# Patient Record
Sex: MALE | Race: WHITE | NOT HISPANIC OR LATINO | Employment: FULL TIME | URBAN - METROPOLITAN AREA
[De-identification: names, ages, dates, MRNs, and addresses within clinical notes are randomized per-mention and may not be internally consistent; named-entity substitution may affect disease eponyms.]

---

## 2022-04-11 ENCOUNTER — HISTORICAL (OUTPATIENT)
Dept: ADMINISTRATIVE | Facility: HOSPITAL | Age: 67
End: 2022-04-11

## 2022-04-24 VITALS
DIASTOLIC BLOOD PRESSURE: 79 MMHG | SYSTOLIC BLOOD PRESSURE: 123 MMHG | HEIGHT: 68 IN | WEIGHT: 163.81 LBS | BODY MASS INDEX: 24.83 KG/M2

## 2022-11-04 PROBLEM — I25.10 ARTERIOSCLEROSIS OF CORONARY ARTERY: Status: ACTIVE | Noted: 2022-11-04

## 2022-11-04 PROBLEM — E11.9 DIABETES MELLITUS: Status: ACTIVE | Noted: 2022-11-04

## 2022-11-04 PROBLEM — C44.90 MALIGNANT NEOPLASM OF SKIN: Status: ACTIVE | Noted: 2022-11-04

## 2022-11-04 PROBLEM — E78.5 HYPERLIPIDEMIA: Status: ACTIVE | Noted: 2022-11-04

## 2022-11-04 PROBLEM — I21.9 MYOCARDIAL INFARCTION: Status: ACTIVE | Noted: 2022-11-04

## 2022-11-04 PROBLEM — G25.81 RESTLESS LEGS SYNDROME: Status: ACTIVE | Noted: 2022-11-04

## 2022-11-04 PROBLEM — I10 HYPERTENSION: Status: ACTIVE | Noted: 2022-11-04

## 2022-11-07 PROCEDURE — 86803 HEPATITIS C AB TEST: CPT | Performed by: FAMILY MEDICINE

## 2023-02-06 PROBLEM — I21.9 MYOCARDIAL INFARCTION: Status: RESOLVED | Noted: 2022-11-04 | Resolved: 2023-02-06

## 2023-04-13 PROBLEM — E29.1 HYPOGONADISM IN MALE: Status: ACTIVE | Noted: 2019-04-03

## 2023-04-13 PROBLEM — R94.6 ABNORMAL THYROID FUNCTION TEST: Status: ACTIVE | Noted: 2019-04-03

## 2023-04-13 PROCEDURE — 83690 ASSAY OF LIPASE: CPT | Performed by: NURSE PRACTITIONER

## 2023-04-13 PROCEDURE — 82150 ASSAY OF AMYLASE: CPT | Performed by: NURSE PRACTITIONER

## 2023-05-02 ENCOUNTER — HOSPITAL ENCOUNTER (EMERGENCY)
Facility: HOSPITAL | Age: 68
Discharge: HOME | End: 2023-05-02
Attending: EMERGENCY MEDICINE
Payer: MEDICARE

## 2023-05-02 VITALS
OXYGEN SATURATION: 99 % | SYSTOLIC BLOOD PRESSURE: 170 MMHG | TEMPERATURE: 97 F | DIASTOLIC BLOOD PRESSURE: 90 MMHG | HEART RATE: 100 BPM | RESPIRATION RATE: 18 BRPM

## 2023-05-02 DIAGNOSIS — N20.1 URETEROLITHIASIS: ICD-10-CM

## 2023-05-02 DIAGNOSIS — I10 HYPERTENSION, UNSPECIFIED TYPE: ICD-10-CM

## 2023-05-02 DIAGNOSIS — N23 RENAL COLIC: Primary | ICD-10-CM

## 2023-05-02 LAB
ALBUMIN SERPL-MCNC: 4.2 G/DL (ref 3.4–4.8)
ALBUMIN/GLOB SERPL: 2 RATIO (ref 1.1–2)
ALP SERPL-CCNC: 61 UNIT/L (ref 40–150)
ALT SERPL-CCNC: 24 UNIT/L (ref 0–55)
APPEARANCE UR: ABNORMAL
AST SERPL-CCNC: 22 UNIT/L (ref 5–34)
BACTERIA #/AREA URNS AUTO: ABNORMAL /HPF
BASOPHILS # BLD AUTO: 0.02 X10(3)/MCL
BASOPHILS NFR BLD AUTO: 0.2 %
BILIRUB UR QL STRIP.AUTO: NEGATIVE MG/DL
BILIRUBIN DIRECT+TOT PNL SERPL-MCNC: 0.7 MG/DL
BUN SERPL-MCNC: 32 MG/DL (ref 8.4–25.7)
CALCIUM SERPL-MCNC: 10.1 MG/DL (ref 8.8–10)
CHLORIDE SERPL-SCNC: 111 MMOL/L (ref 98–107)
CO2 SERPL-SCNC: 19 MMOL/L (ref 23–31)
COLOR UR AUTO: YELLOW
CREAT SERPL-MCNC: 1.51 MG/DL (ref 0.73–1.18)
EOSINOPHIL # BLD AUTO: 0.05 X10(3)/MCL (ref 0–0.9)
EOSINOPHIL NFR BLD AUTO: 0.4 %
ERYTHROCYTE [DISTWIDTH] IN BLOOD BY AUTOMATED COUNT: 13.9 % (ref 11.5–17)
GFR SERPLBLD CREATININE-BSD FMLA CKD-EPI: 50 MLS/MIN/1.73/M2
GLOBULIN SER-MCNC: 2.1 GM/DL (ref 2.4–3.5)
GLUCOSE SERPL-MCNC: 259 MG/DL (ref 82–115)
GLUCOSE UR QL STRIP.AUTO: ABNORMAL MG/DL
HCT VFR BLD AUTO: 45.2 % (ref 42–52)
HGB BLD-MCNC: 15.2 G/DL (ref 14–18)
IMM GRANULOCYTES # BLD AUTO: 0.05 X10(3)/MCL (ref 0–0.04)
IMM GRANULOCYTES NFR BLD AUTO: 0.4 %
KETONES UR QL STRIP.AUTO: ABNORMAL MG/DL
LEUKOCYTE ESTERASE UR QL STRIP.AUTO: NEGATIVE UNIT/L
LIPASE SERPL-CCNC: 18 U/L
LYMPHOCYTES # BLD AUTO: 0.75 X10(3)/MCL (ref 0.6–4.6)
LYMPHOCYTES NFR BLD AUTO: 6.6 %
MCH RBC QN AUTO: 30.3 PG (ref 27–31)
MCHC RBC AUTO-ENTMCNC: 33.6 G/DL (ref 33–36)
MCV RBC AUTO: 90 FL (ref 80–94)
MONOCYTES # BLD AUTO: 0.43 X10(3)/MCL (ref 0.1–1.3)
MONOCYTES NFR BLD AUTO: 3.8 %
NEUTROPHILS # BLD AUTO: 10 X10(3)/MCL (ref 2.1–9.2)
NEUTROPHILS NFR BLD AUTO: 88.6 %
NITRITE UR QL STRIP.AUTO: NEGATIVE
NRBC BLD AUTO-RTO: 0 %
PH UR STRIP.AUTO: 7 [PH]
PLATELET # BLD AUTO: 204 X10(3)/MCL (ref 130–400)
PMV BLD AUTO: 11.6 FL (ref 7.4–10.4)
POTASSIUM SERPL-SCNC: 4.6 MMOL/L (ref 3.5–5.1)
PROT SERPL-MCNC: 6.3 GM/DL (ref 5.8–7.6)
PROT UR QL STRIP.AUTO: NEGATIVE MG/DL
RBC # BLD AUTO: 5.02 X10(6)/MCL (ref 4.7–6.1)
RBC #/AREA URNS AUTO: 251 /HPF
RBC UR QL AUTO: ABNORMAL UNIT/L
SODIUM SERPL-SCNC: 137 MMOL/L (ref 136–145)
SP GR UR STRIP.AUTO: 1.01 (ref 1–1.03)
SQUAMOUS #/AREA URNS AUTO: <5 /HPF
UROBILINOGEN UR STRIP-ACNC: 0.2 MG/DL
WBC # SPEC AUTO: 11.3 X10(3)/MCL (ref 4.5–11.5)
WBC #/AREA URNS AUTO: <5 /HPF

## 2023-05-02 PROCEDURE — 63600175 PHARM REV CODE 636 W HCPCS: Performed by: PHYSICIAN ASSISTANT

## 2023-05-02 PROCEDURE — 96361 HYDRATE IV INFUSION ADD-ON: CPT

## 2023-05-02 PROCEDURE — 96375 TX/PRO/DX INJ NEW DRUG ADDON: CPT

## 2023-05-02 PROCEDURE — 80053 COMPREHEN METABOLIC PANEL: CPT | Performed by: PHYSICIAN ASSISTANT

## 2023-05-02 PROCEDURE — 25000003 PHARM REV CODE 250: Performed by: PHYSICIAN ASSISTANT

## 2023-05-02 PROCEDURE — 25000003 PHARM REV CODE 250: Performed by: EMERGENCY MEDICINE

## 2023-05-02 PROCEDURE — 81001 URINALYSIS AUTO W/SCOPE: CPT | Performed by: PHYSICIAN ASSISTANT

## 2023-05-02 PROCEDURE — 85025 COMPLETE CBC W/AUTO DIFF WBC: CPT | Performed by: PHYSICIAN ASSISTANT

## 2023-05-02 PROCEDURE — 83690 ASSAY OF LIPASE: CPT | Performed by: PHYSICIAN ASSISTANT

## 2023-05-02 PROCEDURE — 99285 EMERGENCY DEPT VISIT HI MDM: CPT | Mod: 25

## 2023-05-02 PROCEDURE — 96374 THER/PROPH/DIAG INJ IV PUSH: CPT

## 2023-05-02 PROCEDURE — 63600175 PHARM REV CODE 636 W HCPCS: Performed by: EMERGENCY MEDICINE

## 2023-05-02 RX ORDER — TAMSULOSIN HYDROCHLORIDE 0.4 MG/1
0.4 CAPSULE ORAL DAILY
Qty: 14 CAPSULE | Refills: 0 | Status: SHIPPED | OUTPATIENT
Start: 2023-05-02 | End: 2023-11-21

## 2023-05-02 RX ORDER — ONDANSETRON 2 MG/ML
4 INJECTION INTRAMUSCULAR; INTRAVENOUS
Status: COMPLETED | OUTPATIENT
Start: 2023-05-02 | End: 2023-05-02

## 2023-05-02 RX ORDER — OXYCODONE AND ACETAMINOPHEN 10; 325 MG/1; MG/1
1 TABLET ORAL
Status: COMPLETED | OUTPATIENT
Start: 2023-05-02 | End: 2023-05-02

## 2023-05-02 RX ORDER — KETOROLAC TROMETHAMINE 10 MG/1
10 TABLET, FILM COATED ORAL EVERY 6 HOURS
Qty: 20 TABLET | Refills: 0 | Status: SHIPPED | OUTPATIENT
Start: 2023-05-02 | End: 2023-05-08

## 2023-05-02 RX ORDER — TAMSULOSIN HYDROCHLORIDE 0.4 MG/1
0.4 CAPSULE ORAL
Status: COMPLETED | OUTPATIENT
Start: 2023-05-02 | End: 2023-05-02

## 2023-05-02 RX ORDER — OXYCODONE AND ACETAMINOPHEN 5; 325 MG/1; MG/1
1 TABLET ORAL EVERY 6 HOURS PRN
Qty: 15 TABLET | Refills: 0 | Status: SHIPPED | OUTPATIENT
Start: 2023-05-02 | End: 2023-05-07

## 2023-05-02 RX ORDER — ONDANSETRON 4 MG/1
4 TABLET, ORALLY DISINTEGRATING ORAL EVERY 8 HOURS PRN
Qty: 15 TABLET | Refills: 0 | Status: SHIPPED | OUTPATIENT
Start: 2023-05-02 | End: 2023-11-21

## 2023-05-02 RX ORDER — MORPHINE SULFATE 4 MG/ML
4 INJECTION, SOLUTION INTRAMUSCULAR; INTRAVENOUS
Status: COMPLETED | OUTPATIENT
Start: 2023-05-02 | End: 2023-05-02

## 2023-05-02 RX ORDER — KETOROLAC TROMETHAMINE 30 MG/ML
15 INJECTION, SOLUTION INTRAMUSCULAR; INTRAVENOUS
Status: COMPLETED | OUTPATIENT
Start: 2023-05-02 | End: 2023-05-02

## 2023-05-02 RX ADMIN — KETOROLAC TROMETHAMINE 15 MG: 30 INJECTION, SOLUTION INTRAMUSCULAR; INTRAVENOUS at 12:05

## 2023-05-02 RX ADMIN — ONDANSETRON 4 MG: 2 INJECTION INTRAMUSCULAR; INTRAVENOUS at 10:05

## 2023-05-02 RX ADMIN — MORPHINE SULFATE 4 MG: 4 INJECTION INTRAVENOUS at 10:05

## 2023-05-02 RX ADMIN — OXYCODONE AND ACETAMINOPHEN 1 TABLET: 325; 10 TABLET ORAL at 01:05

## 2023-05-02 RX ADMIN — TAMSULOSIN HYDROCHLORIDE 0.4 MG: 0.4 CAPSULE ORAL at 01:05

## 2023-05-02 RX ADMIN — SODIUM CHLORIDE 1000 ML: 9 INJECTION, SOLUTION INTRAVENOUS at 10:05

## 2023-05-02 NOTE — ED PROVIDER NOTES
Encounter Date: 5/2/2023       History     Chief Complaint   Patient presents with    Abdominal Pain     C/O middle lower abd pain since this AM. NVD. Denies dysuria. Sees EDOUARD Velasquez     67-year-old male presents to the emergency department complaining of lower abdominal pain with associated nausea and vomiting throughout the morning.  He denies any fever or chills.  Pain radiates to the right flank.    The history is provided by the patient.   Abdominal Pain  The current episode started today. The onset of the illness was abrupt. The problem has not changed since onset.The abdominal pain is located in the RLQ, suprapubic region and periumbilical region. The abdominal pain radiates to the right flank. The severity of the abdominal pain is 10/10. The abdominal pain is relieved by nothing. The other symptoms of the illness include nausea and vomiting. The other symptoms of the illness do not include fever or shortness of breath.   Symptoms associated with the illness do not include hematuria.   Review of patient's allergies indicates:   Allergen Reactions    Lorazepam Palpitations     Other reaction(s): Unknown  BP goes up  BP goes up       Past Medical History:   Diagnosis Date    Hypertension     Kidney stone      No past surgical history on file.  No family history on file.  Social History     Tobacco Use    Smoking status: Never     Passive exposure: Never    Smokeless tobacco: Never   Substance Use Topics    Alcohol use: Not Currently    Drug use: Never     Review of Systems   Constitutional:  Negative for fever.   Respiratory:  Negative for chest tightness and shortness of breath.    Gastrointestinal:  Positive for abdominal pain, nausea and vomiting.   Genitourinary:  Negative for hematuria.     Physical Exam     Initial Vitals [05/02/23 0956]   BP Pulse Resp Temp SpO2   (!) 175/100 67 18 97.4 °F (36.3 °C) 100 %      MAP       --         Physical Exam    Nursing note and vitals reviewed.  Constitutional: He  appears well-developed and well-nourished. No distress.   HENT:   Mouth/Throat: Oropharynx is clear and moist.   Eyes: Conjunctivae are normal. Pupils are equal, round, and reactive to light. No scleral icterus.   Cardiovascular:  Normal rate and regular rhythm.           Pulmonary/Chest: No respiratory distress.   Abdominal: He exhibits no distension.   Musculoskeletal:         General: No edema.     Neurological: He is alert and oriented to person, place, and time. GCS score is 15. GCS eye subscore is 4. GCS verbal subscore is 5. GCS motor subscore is 6.   Skin: Skin is warm and dry.       ED Course   Procedures  Labs Reviewed   COMPREHENSIVE METABOLIC PANEL - Abnormal; Notable for the following components:       Result Value    Chloride 111 (*)     Carbon Dioxide 19 (*)     Glucose Level 259 (*)     Blood Urea Nitrogen 32.0 (*)     Creatinine 1.51 (*)     Calcium Level Total 10.1 (*)     Globulin 2.1 (*)     All other components within normal limits   URINALYSIS, REFLEX TO URINE CULTURE - Abnormal; Notable for the following components:    Appearance, UA Cloudy (*)     Glucose, UA 1+ (*)     Ketones, UA Trace (*)     Blood, UA 3+ (*)     All other components within normal limits   CBC WITH DIFFERENTIAL - Abnormal; Notable for the following components:    MPV 11.6 (*)     Neut # 10.00 (*)     IG# 0.05 (*)     All other components within normal limits   URINALYSIS, MICROSCOPIC - Abnormal; Notable for the following components:    RBC,  (*)     All other components within normal limits   LIPASE - Normal   CBC W/ AUTO DIFFERENTIAL    Narrative:     The following orders were created for panel order CBC auto differential.  Procedure                               Abnormality         Status                     ---------                               -----------         ------                     CBC with Differential[[id number]]        Abnormal            Final result                 Please view results for these  tests on the individual orders.          Imaging Results              CT Abdomen Pelvis  Without Contrast (Final result)  Result time 05/02/23 13:31:10      Final result by Socrates Singh MD (05/02/23 13:31:10)                   Impression:      3 mm right distal ureteral stone with mild hydroureter and mild hydronephrosis. Bilateral nonobstructing stones are present. The largest stone within the cyst right lower pole measures 1 cm.      Electronically signed by: Socrates Singh  Date:    05/02/2023  Time:    13:31               Narrative:    EXAMINATION:  CT ABDOMEN PELVIS WITHOUT CONTRAST    CLINICAL HISTORY:  Flank pain, kidney stone suspected;    TECHNIQUE:  Multidetector non-contrast axial CT images of the abdomen and pelvis were obtained with coronal and sagittal reconstructions.    Automatic exposure control was utilized to reduce the patient's radiation dose.    DLP= 226    COMPARISON:  No prior imaging available for comparison.    FINDINGS:  01. HEPATOBILIARY: No focal hepatic lesion is identified, however evaluation is limited secondary to lack of IV contrast. Status post cholecystectomy.    02. SPLEEN: Normal    03. PANCREAS: The pancreas is grossly unremarkable.    04. ADRENALS: No adrenal nodules.    05. KIDNEYS: 3 mm right distal ureteral stone with mild hydroureter and mild hydronephrosis.  Bilateral nonobstructing stones are present.  The largest stone within the cyst right lower pole measures 1 cm.    06. LYMPHADENOPATHY/RETROPERITONEUM: There is no retroperitoneal lymphadenopathy. The abdominal aorta is normal in course and caliber.  Calcified mid abdominal mesentery is noted.    07. BOWEL: No acute bowel related abnormalities. No evidence of appendiceal inflammation.    08. PELVIC VISCERA: Normal. No pelvic mass.    09. PELVIC LYMPH NODES: No lymphadenopathy.    10. PERITONEUM/ABDOMINAL WALL: No ascites or implant.    11. SKELETAL: Degenerative changes of the spine.    12. LUNG BASES: The  visualized lungs are unremarkable.                                       Medications   sodium chloride 0.9% bolus 1,000 mL 1,000 mL (0 mLs Intravenous Stopped 5/2/23 1413)   ondansetron injection 4 mg (4 mg Intravenous Given 5/2/23 1000)   morphine injection 4 mg (4 mg Intravenous Given 5/2/23 1000)   ketorolac injection 15 mg (15 mg Intravenous Given 5/2/23 1217)   oxyCODONE-acetaminophen  mg per tablet 1 tablet (1 tablet Oral Given 5/2/23 1345)   tamsulosin 24 hr capsule 0.4 mg (0.4 mg Oral Given 5/2/23 1345)   Medical Decision Making  Problems Addressed:  Hypertension, unspecified type: chronic illness or injury  Renal colic: acute illness or injury  Ureterolithiasis: acute illness or injury      ED assessment:    Mr. Gabehart presented to the emergency department for evaluation of abdominal pain, flank pain, nausea and vomiting throughout the morning.  History of hypertension and hypertensive on arrival, likely due to pain.  Afebrile, nontoxic appearing.    Differential diagnosis (including but not limited to):   Renal colic, ureterolithiasis, urinary tract infection, acute low back strain, bowel obstruction, bowel perforation, appendicitis, AAA    ED management:   Laboratory studies with hematuria, no indication of urinary tract infection, otherwise no significant abnormalities.  CT scan demonstrates 3 mm mildly obstructing distal ureterolithiasis. No evidence of impaired renal function or urinary tract infection. Patient's pain is controlled and he is tolerating oral fluids w/o emesis at this time. Will discharge home with analgesics, antiemetics and Flomax for trial of medical expulsive therapy. I informed the patient of the risks associated with opiate use and the option to fill less than the prescribed amount.  Advised to follow up with primary care and contact information provided to establish care with urology as well as has additional intrarenal stones on imaging.   ED return precautions discussed  with the patient at bedside and provided in the printed discharge instructions. All questions answered to the best of my ability.      My independent radiology interpretation:   CT abdomen pelvis without IV contrast:  Small distal ureterolithiasis on the right, no free air, nonobstructive bowel gas pattern, comprehensive interpretation as per radiology report above    Amount and/or Complexity of Data Reviewed  Independent historian: none   Summary of history:   External data reviewed: prior imaging  Summary of data reviewed: prior US abd report from 4/6/2023 reviewed, bilateral non-obstructing renal stones noted at that time  Risk and benefits of testing: discussed   Labs: ordered and reviewed  Radiology: ordered and independent interpretation performed (see above or ED course)      Risk  Prescription drug management   Parenteral controlled substances   Shared decision making     Critical Care  none    INeeta MD personally performed the history, PE, MDM, and procedures as documented above and agree with the scribe's documentation.                              Clinical Impression:   Final diagnoses:  [N23] Renal colic (Primary)  [N20.1] Ureterolithiasis        ED Disposition Condition    Discharge Stable          ED Prescriptions       Medication Sig Dispense Start Date End Date Auth. Provider    ketorolac (TORADOL) 10 mg tablet Take 1 tablet (10 mg total) by mouth every 6 (six) hours. for 5 days 20 tablet 5/2/2023 5/7/2023 Neeta Albarran MD    oxyCODONE-acetaminophen (PERCOCET) 5-325 mg per tablet Take 1 tablet by mouth every 6 (six) hours as needed for Pain. 15 tablet 5/2/2023 5/7/2023 Neeta Albarran MD    tamsulosin (FLOMAX) 0.4 mg Cap Take 1 capsule (0.4 mg total) by mouth once daily. for 14 days 14 capsule 5/2/2023 5/16/2023 Neeta Albarran MD    ondansetron (ZOFRAN-ODT) 4 MG TbDL Take 1 tablet (4 mg total) by mouth every 8 (eight) hours as needed (nausea). 15 tablet 5/2/2023 -- Neeta Albarran  MD          Follow-up Information       Follow up With Specialties Details Why Contact Info    [facility] - Emergency Dept Emergency Medicine  As needed, If symptoms worsen [address] [facility] [place] [id number] [phone]    Danny Dumont MD Family Medicine   [address] [facility] [address]  [phone]      Martinez Croft,  Family Medicine  This is a local urologist.  You may contact this urologist or any other urologist of your choice to arrange follow-up evaluation. [address] [facility] [address]  [phone]               Neeta Albarran MD  05/21/23 2006

## 2023-05-02 NOTE — FIRST PROVIDER EVALUATION
"Medical screening examination initiated.  I have conducted a focused provider triage encounter, findings are as follows:    Chief Complaint   Patient presents with    Abdominal Pain     C/O middle lower abd pain since this AM. NVD. Denies dysuria. Sees EDOUARD Velasquez     Brief history of present illness:  67 y.o. male presents to the ED with severe lower abdominal pain that began around 0330 this morning. Notes diarrhea and 1 episode of vomiting. No difficulty urinating, dysuria. History of kidney stones. States he has gone to the bathroom "40 times" today. GI is Dr Velasquez    Vitals:    05/02/23 0956   BP: (!) 175/100   Pulse: 67   Resp: 18   Temp: 97.4 °F (36.3 °C)   TempSrc: Oral   SpO2: 100%       Pertinent physical exam:  Awake, alert, ambulatory, non-labored respirations    Brief workup plan:  labs, UA    Preliminary workup initiated; this workup will be continued and followed by the physician or advanced practice provider that is assigned to the patient when roomed.  "

## 2023-05-08 PROBLEM — E78.2 MIXED HYPERLIPIDEMIA: Status: ACTIVE | Noted: 2022-11-04

## 2023-05-09 PROBLEM — H40.10X0 OPEN-ANGLE GLAUCOMA OF LEFT EYE: Status: ACTIVE | Noted: 2023-05-09

## 2023-11-21 PROBLEM — N20.0 KIDNEY STONE: Status: ACTIVE | Noted: 2023-05-12

## 2024-10-04 RX ORDER — GLIPIZIDE 2.5 MG/1
5 TABLET, EXTENDED RELEASE ORAL
COMMUNITY

## 2024-10-08 ENCOUNTER — ANESTHESIA (OUTPATIENT)
Facility: HOSPITAL | Age: 69
End: 2024-10-08
Payer: MEDICARE

## 2024-10-08 ENCOUNTER — HOSPITAL ENCOUNTER (OUTPATIENT)
Facility: HOSPITAL | Age: 69
Discharge: HOME | End: 2024-10-08
Attending: OPHTHALMOLOGY | Admitting: OPHTHALMOLOGY
Payer: MEDICARE

## 2024-10-08 ENCOUNTER — ANESTHESIA EVENT (OUTPATIENT)
Facility: HOSPITAL | Age: 69
End: 2024-10-08
Payer: MEDICARE

## 2024-10-08 VITALS
HEIGHT: 68 IN | HEART RATE: 81 BPM | DIASTOLIC BLOOD PRESSURE: 93 MMHG | TEMPERATURE: 98 F | WEIGHT: 155 LBS | SYSTOLIC BLOOD PRESSURE: 138 MMHG | BODY MASS INDEX: 23.49 KG/M2 | OXYGEN SATURATION: 95 %

## 2024-10-08 DIAGNOSIS — Q12.0 CATARACT AND GLAUCOMA SYNDROME: ICD-10-CM

## 2024-10-08 DIAGNOSIS — Q87.89 CATARACT AND GLAUCOMA SYNDROME: ICD-10-CM

## 2024-10-08 DIAGNOSIS — Q15.0 CATARACT AND GLAUCOMA SYNDROME: ICD-10-CM

## 2024-10-08 LAB
POCT GLUCOSE: 109 MG/DL (ref 70–110)
POCT GLUCOSE: 95 MG/DL (ref 70–110)

## 2024-10-08 PROCEDURE — 25000003 PHARM REV CODE 250

## 2024-10-08 PROCEDURE — 27201423 OPTIME MED/SURG SUP & DEVICES STERILE SUPPLY: Performed by: OPHTHALMOLOGY

## 2024-10-08 PROCEDURE — 36000706: Performed by: OPHTHALMOLOGY

## 2024-10-08 PROCEDURE — V2632 POST CHMBR INTRAOCULAR LENS: HCPCS | Performed by: OPHTHALMOLOGY

## 2024-10-08 PROCEDURE — 37000008 HC ANESTHESIA 1ST 15 MINUTES: Performed by: OPHTHALMOLOGY

## 2024-10-08 PROCEDURE — 63600175 PHARM REV CODE 636 W HCPCS: Performed by: ANESTHESIOLOGY

## 2024-10-08 PROCEDURE — C1889 IMPLANT/INSERT DEVICE, NOC: HCPCS | Performed by: OPHTHALMOLOGY

## 2024-10-08 PROCEDURE — 37000009 HC ANESTHESIA EA ADD 15 MINS: Performed by: OPHTHALMOLOGY

## 2024-10-08 PROCEDURE — 71000015 HC POSTOP RECOV 1ST HR: Performed by: OPHTHALMOLOGY

## 2024-10-08 PROCEDURE — 25000003 PHARM REV CODE 250: Performed by: OPHTHALMOLOGY

## 2024-10-08 PROCEDURE — 63600175 PHARM REV CODE 636 W HCPCS: Performed by: NURSE ANESTHETIST, CERTIFIED REGISTERED

## 2024-10-08 PROCEDURE — 36000707: Performed by: OPHTHALMOLOGY

## 2024-10-08 PROCEDURE — 63600175 PHARM REV CODE 636 W HCPCS: Mod: JZ | Performed by: OPHTHALMOLOGY

## 2024-10-08 PROCEDURE — A4216 STERILE WATER/SALINE, 10 ML: HCPCS | Performed by: OPHTHALMOLOGY

## 2024-10-08 DEVICE — TECNIS SIMPLICITY TECNIS EYHANCE U 17.0D
Type: IMPLANTABLE DEVICE | Site: EYE | Status: FUNCTIONAL
Brand: TECNIS SIMPLICITY

## 2024-10-08 DEVICE — HYDRUS® MICROSTENT
Type: IMPLANTABLE DEVICE | Site: EYE | Status: FUNCTIONAL
Brand: HYDRUS® MICROSTENT

## 2024-10-08 RX ORDER — HYDROMORPHONE HYDROCHLORIDE 2 MG/ML
0.2 INJECTION, SOLUTION INTRAMUSCULAR; INTRAVENOUS; SUBCUTANEOUS EVERY 5 MIN PRN
Status: DISCONTINUED | OUTPATIENT
Start: 2024-10-08 | End: 2024-10-08 | Stop reason: HOSPADM

## 2024-10-08 RX ORDER — LIDOCAINE HYDROCHLORIDE 10 MG/ML
1 INJECTION, SOLUTION EPIDURAL; INFILTRATION; INTRACAUDAL; PERINEURAL ONCE
OUTPATIENT
Start: 2024-10-08 | End: 2024-10-08

## 2024-10-08 RX ORDER — GLUCAGON 1 MG
1 KIT INJECTION
Status: DISCONTINUED | OUTPATIENT
Start: 2024-10-08 | End: 2024-10-08 | Stop reason: HOSPADM

## 2024-10-08 RX ORDER — CEFAZOLIN SODIUM 1 G/3ML
INJECTION, POWDER, FOR SOLUTION INTRAMUSCULAR; INTRAVENOUS
Status: DISCONTINUED | OUTPATIENT
Start: 2024-10-08 | End: 2024-10-08 | Stop reason: HOSPADM

## 2024-10-08 RX ORDER — HYDROMORPHONE HYDROCHLORIDE 2 MG/ML
0.4 INJECTION, SOLUTION INTRAMUSCULAR; INTRAVENOUS; SUBCUTANEOUS EVERY 5 MIN PRN
Status: DISCONTINUED | OUTPATIENT
Start: 2024-10-08 | End: 2024-10-08 | Stop reason: HOSPADM

## 2024-10-08 RX ORDER — LIDOCAINE HYDROCHLORIDE 10 MG/ML
INJECTION, SOLUTION EPIDURAL; INFILTRATION; INTRACAUDAL; PERINEURAL
Status: DISCONTINUED | OUTPATIENT
Start: 2024-10-08 | End: 2024-10-08

## 2024-10-08 RX ORDER — LIDOCAINE HYDROCHLORIDE 10 MG/ML
INJECTION, SOLUTION EPIDURAL; INFILTRATION; INTRACAUDAL; PERINEURAL
Status: DISCONTINUED | OUTPATIENT
Start: 2024-10-08 | End: 2024-10-08 | Stop reason: HOSPADM

## 2024-10-08 RX ORDER — PROPOFOL 10 MG/ML
VIAL (ML) INTRAVENOUS CONTINUOUS PRN
Status: DISCONTINUED | OUTPATIENT
Start: 2024-10-08 | End: 2024-10-08

## 2024-10-08 RX ORDER — KETOROLAC TROMETHAMINE 30 MG/ML
INJECTION, SOLUTION INTRAMUSCULAR; INTRAVENOUS
Status: DISCONTINUED | OUTPATIENT
Start: 2024-10-08 | End: 2024-10-08

## 2024-10-08 RX ORDER — PROCHLORPERAZINE EDISYLATE 5 MG/ML
5 INJECTION INTRAMUSCULAR; INTRAVENOUS EVERY 30 MIN PRN
Status: DISCONTINUED | OUTPATIENT
Start: 2024-10-08 | End: 2024-10-08 | Stop reason: HOSPADM

## 2024-10-08 RX ORDER — CYCLOPENTOLATE HYDROCHLORIDE 10 MG/ML
1 SOLUTION/ DROPS OPHTHALMIC
Status: COMPLETED | OUTPATIENT
Start: 2024-10-08 | End: 2024-10-08

## 2024-10-08 RX ORDER — FENTANYL CITRATE 50 UG/ML
INJECTION, SOLUTION INTRAMUSCULAR; INTRAVENOUS
Status: DISCONTINUED | OUTPATIENT
Start: 2024-10-08 | End: 2024-10-08

## 2024-10-08 RX ORDER — DEXAMETHASONE SODIUM PHOSPHATE 10 MG/ML
INJECTION INTRAMUSCULAR; INTRAVENOUS
Status: DISCONTINUED | OUTPATIENT
Start: 2024-10-08 | End: 2024-10-08 | Stop reason: HOSPADM

## 2024-10-08 RX ORDER — TROPICAMIDE 10 MG/ML
1 SOLUTION/ DROPS OPHTHALMIC
Status: COMPLETED | OUTPATIENT
Start: 2024-10-08 | End: 2024-10-08

## 2024-10-08 RX ORDER — ACETAMINOPHEN 500 MG
1000 TABLET ORAL ONCE
Status: COMPLETED | OUTPATIENT
Start: 2024-10-08 | End: 2024-10-08

## 2024-10-08 RX ORDER — PROPOFOL 10 MG/ML
VIAL (ML) INTRAVENOUS
Status: DISCONTINUED | OUTPATIENT
Start: 2024-10-08 | End: 2024-10-08

## 2024-10-08 RX ORDER — POVIDONE-IODINE 5 %
SOLUTION, NON-ORAL OPHTHALMIC (EYE)
Status: DISCONTINUED | OUTPATIENT
Start: 2024-10-08 | End: 2024-10-08 | Stop reason: HOSPADM

## 2024-10-08 RX ORDER — DEXAMETHASONE SODIUM PHOSPHATE 4 MG/ML
INJECTION, SOLUTION INTRA-ARTICULAR; INTRALESIONAL; INTRAMUSCULAR; INTRAVENOUS; SOFT TISSUE
Status: DISCONTINUED | OUTPATIENT
Start: 2024-10-08 | End: 2024-10-08

## 2024-10-08 RX ORDER — IPRATROPIUM BROMIDE AND ALBUTEROL SULFATE 2.5; .5 MG/3ML; MG/3ML
3 SOLUTION RESPIRATORY (INHALATION)
Status: DISCONTINUED | OUTPATIENT
Start: 2024-10-08 | End: 2024-10-08 | Stop reason: HOSPADM

## 2024-10-08 RX ORDER — SODIUM CHLORIDE, SODIUM GLUCONATE, SODIUM ACETATE, POTASSIUM CHLORIDE AND MAGNESIUM CHLORIDE 30; 37; 368; 526; 502 MG/100ML; MG/100ML; MG/100ML; MG/100ML; MG/100ML
INJECTION, SOLUTION INTRAVENOUS CONTINUOUS
OUTPATIENT
Start: 2024-10-08 | End: 2024-11-07

## 2024-10-08 RX ORDER — MIDAZOLAM HYDROCHLORIDE 1 MG/ML
INJECTION INTRAMUSCULAR; INTRAVENOUS
Status: DISCONTINUED | OUTPATIENT
Start: 2024-10-08 | End: 2024-10-08

## 2024-10-08 RX ORDER — BUPIVACAINE HYDROCHLORIDE 7.5 MG/ML
INJECTION, SOLUTION EPIDURAL; RETROBULBAR
Status: DISCONTINUED | OUTPATIENT
Start: 2024-10-08 | End: 2024-10-08 | Stop reason: HOSPADM

## 2024-10-08 RX ORDER — EPINEPHRINE 1 MG/ML
INJECTION INTRAMUSCULAR; INTRAVENOUS; SUBCUTANEOUS
Status: DISCONTINUED | OUTPATIENT
Start: 2024-10-08 | End: 2024-10-08 | Stop reason: HOSPADM

## 2024-10-08 RX ORDER — ONDANSETRON HYDROCHLORIDE 2 MG/ML
4 INJECTION, SOLUTION INTRAVENOUS DAILY PRN
Status: DISCONTINUED | OUTPATIENT
Start: 2024-10-08 | End: 2024-10-08 | Stop reason: HOSPADM

## 2024-10-08 RX ORDER — ONDANSETRON 4 MG/1
8 TABLET, ORALLY DISINTEGRATING ORAL EVERY 6 HOURS PRN
OUTPATIENT
Start: 2024-10-08

## 2024-10-08 RX ORDER — PHENYLEPHRINE HYDROCHLORIDE 100 MG/ML
1 SOLUTION/ DROPS OPHTHALMIC
Status: COMPLETED | OUTPATIENT
Start: 2024-10-08 | End: 2024-10-08

## 2024-10-08 RX ORDER — MEPERIDINE HYDROCHLORIDE 25 MG/ML
12.5 INJECTION INTRAMUSCULAR; INTRAVENOUS; SUBCUTANEOUS EVERY 10 MIN PRN
Status: DISCONTINUED | OUTPATIENT
Start: 2024-10-08 | End: 2024-10-08 | Stop reason: HOSPADM

## 2024-10-08 RX ORDER — SODIUM CHLORIDE, SODIUM LACTATE, POTASSIUM CHLORIDE, CALCIUM CHLORIDE 600; 310; 30; 20 MG/100ML; MG/100ML; MG/100ML; MG/100ML
INJECTION, SOLUTION INTRAVENOUS CONTINUOUS
Status: DISCONTINUED | OUTPATIENT
Start: 2024-10-08 | End: 2024-10-08 | Stop reason: HOSPADM

## 2024-10-08 RX ADMIN — CYCLOPENTOLATE HYDROCHLORIDE 1 DROP: 10 SOLUTION/ DROPS OPHTHALMIC at 02:10

## 2024-10-08 RX ADMIN — PHENYLEPHRINE HYDROCHLORIDE 1 DROP: 100 SOLUTION/ DROPS OPHTHALMIC at 01:10

## 2024-10-08 RX ADMIN — MIDAZOLAM 1 MG: 1 INJECTION INTRAMUSCULAR; INTRAVENOUS at 03:10

## 2024-10-08 RX ADMIN — PROPOFOL 75 MG: 10 INJECTION, EMULSION INTRAVENOUS at 03:10

## 2024-10-08 RX ADMIN — LIDOCAINE HYDROCHLORIDE 50 MG: 10 INJECTION, SOLUTION EPIDURAL; INFILTRATION; INTRACAUDAL; PERINEURAL at 03:10

## 2024-10-08 RX ADMIN — KETOROLAC TROMETHAMINE 30 MG: 30 INJECTION, SOLUTION INTRAMUSCULAR at 03:10

## 2024-10-08 RX ADMIN — DEXAMETHASONE SODIUM PHOSPHATE 4 MG: 4 INJECTION, SOLUTION INTRA-ARTICULAR; INTRALESIONAL; INTRAMUSCULAR; INTRAVENOUS; SOFT TISSUE at 03:10

## 2024-10-08 RX ADMIN — PROPOFOL 50 MCG/KG/MIN: 10 INJECTION, EMULSION INTRAVENOUS at 03:10

## 2024-10-08 RX ADMIN — ACETAMINOPHEN 1000 MG: 500 TABLET ORAL at 01:10

## 2024-10-08 RX ADMIN — ONDANSETRON HYDROCHLORIDE 4 MG: 2 SOLUTION INTRAMUSCULAR; INTRAVENOUS at 03:10

## 2024-10-08 RX ADMIN — CYCLOPENTOLATE HYDROCHLORIDE 1 DROP: 10 SOLUTION/ DROPS OPHTHALMIC at 01:10

## 2024-10-08 RX ADMIN — FENTANYL CITRATE 50 MCG: 50 INJECTION INTRAMUSCULAR; INTRAVENOUS at 03:10

## 2024-10-08 RX ADMIN — MITOMYCIN 0.2 MG: 5 INJECTION, POWDER, LYOPHILIZED, FOR SOLUTION INTRAVENOUS at 03:10

## 2024-10-08 RX ADMIN — TROPICAMIDE 1 DROP: 10 SOLUTION/ DROPS OPHTHALMIC at 01:10

## 2024-10-08 RX ADMIN — PHENYLEPHRINE HYDROCHLORIDE 1 DROP: 100 SOLUTION/ DROPS OPHTHALMIC at 02:10

## 2024-10-08 RX ADMIN — TROPICAMIDE 1 DROP: 10 SOLUTION/ DROPS OPHTHALMIC at 02:10

## 2024-10-08 RX ADMIN — SODIUM CHLORIDE, POTASSIUM CHLORIDE, SODIUM LACTATE AND CALCIUM CHLORIDE: 600; 310; 30; 20 INJECTION, SOLUTION INTRAVENOUS at 02:10

## 2024-10-08 NOTE — ANESTHESIA PREPROCEDURE EVALUATION
"                                                                                                             10/08/2024  E C Gabehart is a 69 y.o., male with CAD status post CABG () and stent (last in January 2023), LESLIE with preserved EF presents as an outpatient for left cataract extraction with glaucoma procedure.    Left heart catheterization January 2023   Left main normal   Lad 95% proximal treated with DAVID and atretic LIMA to LAD   Left circumflex patent mid stent with jailed stent treated with angioplasty   RCA luminal irregularities with PDA 40%   EF 65%  LVEDP 17    Last 3 sets of Vitals        8/27/2024    11:24 AM 10/4/2024     9:55 AM 10/8/2024     1:45 PM   Vitals - 1 value per visit   SYSTOLIC   135   DIASTOLIC   91   Pulse   77   Temp   36.4 °C (97.5 °F)   Resp 17     SPO2   99 %   Weight (lb)  155    Weight (kg)  70.308    Height  5' 8" (1.727 m)    BMI (Calculated)  23.6          Lab Results   Component Value Date    WBC 6.90 11/21/2023    HGB 15.1 11/21/2023    HCT 46.1 11/21/2023    MCV 92.4 11/21/2023     11/21/2023          BMP  Lab Results   Component Value Date     11/21/2023    K 5.6 (H) 11/21/2023     11/21/2023    CO2 30 11/21/2023    BUN 21.0 (H) 11/21/2023    CREATININE 1.05 11/21/2023    CALCIUM 10.1 11/21/2023    ANIONGAP 6 (L) 01/04/2023    ESTGFRAFRICA 95 01/04/2023    EGFRNONAA >60 11/20/2020        Pre-op Assessment    I have reviewed the Patient Summary Reports.    I have reviewed the NPO Status.   I have reviewed the Medications.     Review of Systems  Anesthesia Hx:               Denies Personal Hx of Anesthesia complications.                    Social:  Non-Smoker       Cardiovascular:     Hypertension                Functional Capacity good / => 4 METS   Coronary Artery Disease:      S/P Percutaneous Coronary Intervention (PCI)   S/P Drug Eluting Stent (DAVID)  S/P Aorto-Coronary Bypass Graft Surgery (CABG): Hx of Myocardial Infarction                      "   Pulmonary:        Sleep Apnea                Endocrine:  Diabetes, type 2               Physical Exam  General: Well nourished, Cooperative, Alert and Oriented    Airway:  Mallampati: II   Mouth Opening: Normal  TM Distance: Normal  Tongue: Normal  Neck ROM: Normal ROM    Dental:  Intact, Caps / Implants    Chest/Lungs:  Clear to auscultation, Normal Respiratory Rate    Heart:  Rate: Normal  Rhythm: Regular Rhythm        Anesthesia Plan  Type of Anesthesia, risks & benefits discussed:    Anesthesia Type: Gen Natural Airway  Intra-op Monitoring Plan: Standard ASA Monitors  Induction:  IV  Informed Consent: Informed consent signed with the Patient and all parties understand the risks and agree with anesthesia plan.  All questions answered.   ASA Score: 3  Day of Surgery Review of History & Physical: H&P Update referred to the surgeon/provider.    Ready For Surgery From Anesthesia Perspective.     .

## 2024-10-08 NOTE — OP NOTE
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    PROCEDURE PERFORMED:  OMNI 360 with trabeculotomy and viscocanal dilation, 360 degrees of the left eye; Hydrus microstent placement left eye; phacoemulsification of the left eye with iol; bleb revision with MMC left eye     COMPLICATIONS:  None.    BLOOD LOSS:  Less than 1 cc.    ANESTHESIA: Gen IV    IOL:17.0 diboo    INDICATIONS FOR PROCEDURE:  Patient was evaluated in clinic and noted to have elevated pressure despite maximum tolerated medical therapy.  Advantages, risks and benefits of surgical intervention were discussed with the patient including, but not limited to, bleeding, infection, decreased vision, loss of the eye, need for subsequent surgery, or worsening of cataracts.  Patient acknowledged understanding and wished to proceed.  Informed consent was obtained from the patient in the office.    PROCEDURE IN DETAIL:  The patient was brought to the operating room and laid in supine position.  Anesthesia was undertaken without complication.  The operative eye was prepped and draped in the usual manner for intraocular surgery.  A wire lid speculum was placed in the operative eye for adequate exposure to the surgical site.A paracentesis incision was made at approximately 4 o'clock with a clear cornea at the limbus. Preservative free Lidocaine and epinephrine was injected into the anterior chamber followed by viscoelastic.  A triplanar cataract wound was then created approximately 2 o'clock through clear cornea at the limbus.  Curvilinear capsulorrhexis was created without complication.     BSS sonic cannula was used to hydrodissect the lens.  The lens was found to move freely within the capsular bag.  Lens was then removed in divide and conquer  technique.  Remaining cortical material was removed with I&A handpiece. The lens was then implanted into the capsular bag. The remaining viscoelastic was then removed with the irrigation aspiration handpiece.     Preservative-free lidocaine and Marcaine were injected into the anterior chamber, followed by Miostat and Healon GV.  A prism was used to locate the trabecular meshwork.  The OMNI system was introduced into the eye.  The trabecular meshwork was then removed in a shave technique, and the cannula was advanced through the canal without complication; it was done both superiorly and inferiorly for 360 degrees for viscodilation of the canal.  At that time, the cannula was reintroduced into the canal and advanced 180 degrees.  It was removed from the canal, performing a trabeculotomy without complication.  That procedure was performed both superiorly and inferiorly 180 degrees.  Hydrus microstent was placed and sat in good position. The Healon was removed using balanced salt solution.  The wounds were hydrated. 8.0 vicryl was used as a taction suture . 0.1 cc of MMC (0.4 mg/ml ) was injected near the bleb  IOP was judged to be physiologic.  Postoperative injection was given.  Patient was cleaned in wet-to-dry fashion.  Pressure patch and shield placed on the operative eye.  Patient was turned over to Anesthesia in stable condition

## 2024-10-08 NOTE — DISCHARGE INSTRUCTIONS
[facility], Care After    Refer to this sheet in the next few weeks. These instructions provide you with information on caring for yourself after your procedure. Your health care provider may also give you more specific instructions. Your treatment has been planned according to current medical practices, but problems sometimes occur. Call your health care provider if you have any problems or questions after your procedure.    WHAT TO EXPECT AFTER THE PROCEDURE  After your procedure, it is typical to have the following:  Changes in depth perception.  Blurry vision.  Eye discomfort.    HOME CARE INSTRUCTIONS  Keep all follow-up visits as directed by your health care provider. This is important.  You may receive medicine to help with pain or discomfort. Take medicines only as directed by your health care provider.  Leave patch on overnight, keep it clean and dry. Dr Dacosta will remove it a your follow up appointment.   Avoid sleeping on your stomach or operative side. Sleep on your back with your head elevated on 2-3 pillows.  No strenuous activity, heavy lifting, bending over, or straining of any kind.  No eye drops overnight into surgery eye. Bring all eye drops to your follow up appointment.  Resume all other meds taken at home.  Continue eye drops in the non-surgical eye.    Meds:  Tylenol 650mg by mouth every four hours as needed for pain.  Next dose at 7:50 pm (mild pain)  You may take ibuprofen or your prescribed, Toradol every 4-6 hours as needed for pain next dose at 7:35 pm (moderate pain)  You may take Phenergan 12.5mg 1 every 4-5 hours as needed for nausea next dose at any time. May cause drowsiness.     Avoid:  Rubbing your eyes.  Smoking.  Contact sports, strenuous yard work, housework, and swimming.  Lifting heavy objects and bending for 1-2 weeks.    SEEK MEDICAL CARE IF:  You notice signs of infection in your eye.  You develop increased pain, inflammation, or swelling.  You notice visual  changes or a pus-like drainage.    SEEK IMMEDIATE MEDICAL CARE IF:  You lose all vision.

## 2024-10-08 NOTE — ANESTHESIA POSTPROCEDURE EVALUATION
Anesthesia Post Evaluation    Patient: E C Gabehart    Procedure(s) Performed: Procedure(s) (LRB):  EXTRACTION, CATARACT, WITH IOL AND AQUEOUS DRAINAGE DEVICE INSERTION  ////left eye; Phaco Omni Hydrus; Catalys (Left)  DILATION, AQUEOUS OUTFLOW CANAL, TRANSLUMINAL, WITHOUT DEVICE RETENTION   ////left eye (Left)  REPAIR, SURGICAL WOUND, EYE, ANTERIOR SEGMENT  ////left eye (Left)    Final Anesthesia Type: regional      Patient location during evaluation: OPS  Patient participation: Yes- Able to Participate  Level of consciousness: awake and alert  Post-procedure vital signs: reviewed and stable  Pain management: adequate  Airway patency: patent    PONV status at discharge: No PONV  Anesthetic complications: no      Cardiovascular status: blood pressure returned to baseline  Respiratory status: unassisted, spontaneous ventilation and room air  Hydration status: euvolemic  Follow-up not needed.              Vitals Value Taken Time   /91 10/08/24 1345   Temp 36.4 °C (97.5 °F) 10/08/24 1345   Pulse 77 10/08/24 1345   Resp 18 10/08/24 1554   SpO2 99 % 10/08/24 1345         No case tracking events are documented in the log.      Pain/Annette Score: Pain Rating Prior to Med Admin: 0 (10/8/2024  1:50 PM)

## 2024-11-25 PROBLEM — G47.00 INSOMNIA: Status: ACTIVE | Noted: 2024-11-25

## 2025-05-09 RX ORDER — CLOPIDOGREL BISULFATE 75 MG/1
75 TABLET ORAL DAILY
COMMUNITY

## 2025-05-12 ENCOUNTER — ANESTHESIA EVENT (OUTPATIENT)
Facility: HOSPITAL | Age: 70
End: 2025-05-12
Payer: MEDICARE

## 2025-05-13 ENCOUNTER — HOSPITAL ENCOUNTER (OUTPATIENT)
Facility: HOSPITAL | Age: 70
Discharge: HOME | End: 2025-05-13
Attending: OPHTHALMOLOGY | Admitting: OPHTHALMOLOGY
Payer: MEDICARE

## 2025-05-13 ENCOUNTER — ANESTHESIA (OUTPATIENT)
Facility: HOSPITAL | Age: 70
End: 2025-05-13
Payer: MEDICARE

## 2025-05-13 VITALS
SYSTOLIC BLOOD PRESSURE: 122 MMHG | OXYGEN SATURATION: 96 % | BODY MASS INDEX: 23.95 KG/M2 | DIASTOLIC BLOOD PRESSURE: 70 MMHG | HEART RATE: 77 BPM | TEMPERATURE: 98 F | HEIGHT: 68 IN | WEIGHT: 158 LBS

## 2025-05-13 DIAGNOSIS — H40.1112 PRIMARY OPEN ANGLE GLAUCOMA OF RIGHT EYE, MODERATE STAGE: ICD-10-CM

## 2025-05-13 DIAGNOSIS — Z01.818 PREOP TESTING: ICD-10-CM

## 2025-05-13 LAB
GLUCOSE SERPL-MCNC: 95 MG/DL (ref 70–110)
POCT GLUCOSE: 101 MG/DL (ref 70–110)

## 2025-05-13 PROCEDURE — 63600175 PHARM REV CODE 636 W HCPCS: Mod: JZ

## 2025-05-13 PROCEDURE — 25000003 PHARM REV CODE 250: Performed by: OPHTHALMOLOGY

## 2025-05-13 PROCEDURE — 71000015 HC POSTOP RECOV 1ST HR: Performed by: OPHTHALMOLOGY

## 2025-05-13 PROCEDURE — 37000008 HC ANESTHESIA 1ST 15 MINUTES: Performed by: OPHTHALMOLOGY

## 2025-05-13 PROCEDURE — 36000706: Performed by: OPHTHALMOLOGY

## 2025-05-13 PROCEDURE — 63600175 PHARM REV CODE 636 W HCPCS: Performed by: NURSE ANESTHETIST, CERTIFIED REGISTERED

## 2025-05-13 PROCEDURE — 63600175 PHARM REV CODE 636 W HCPCS: Performed by: OPHTHALMOLOGY

## 2025-05-13 RX ORDER — LIDOCAINE HYDROCHLORIDE 20 MG/ML
INJECTION, SOLUTION EPIDURAL; INFILTRATION; INTRACAUDAL; PERINEURAL
Status: DISCONTINUED | OUTPATIENT
Start: 2025-05-13 | End: 2025-05-13 | Stop reason: HOSPADM

## 2025-05-13 RX ORDER — MORPHINE SULFATE 4 MG/ML
INJECTION, SOLUTION INTRAMUSCULAR; INTRAVENOUS
Status: DISCONTINUED
Start: 2025-05-13 | End: 2025-05-13 | Stop reason: WASHOUT

## 2025-05-13 RX ORDER — BUPIVACAINE HYDROCHLORIDE 7.5 MG/ML
INJECTION, SOLUTION EPIDURAL; RETROBULBAR
Status: DISCONTINUED | OUTPATIENT
Start: 2025-05-13 | End: 2025-05-13 | Stop reason: HOSPADM

## 2025-05-13 RX ORDER — GLUCAGON 1 MG
1 KIT INJECTION
Status: DISCONTINUED | OUTPATIENT
Start: 2025-05-13 | End: 2025-05-13 | Stop reason: HOSPADM

## 2025-05-13 RX ORDER — ACETAMINOPHEN 500 MG
1000 TABLET ORAL ONCE
Status: COMPLETED | OUTPATIENT
Start: 2025-05-13 | End: 2025-05-13

## 2025-05-13 RX ORDER — LIDOCAINE HYDROCHLORIDE 10 MG/ML
INJECTION, SOLUTION EPIDURAL; INFILTRATION; INTRACAUDAL; PERINEURAL
Status: DISCONTINUED | OUTPATIENT
Start: 2025-05-13 | End: 2025-05-13

## 2025-05-13 RX ORDER — KETOROLAC TROMETHAMINE 30 MG/ML
30 INJECTION, SOLUTION INTRAMUSCULAR; INTRAVENOUS ONCE
Status: COMPLETED | OUTPATIENT
Start: 2025-05-13 | End: 2025-05-13

## 2025-05-13 RX ORDER — SODIUM CHLORIDE, SODIUM GLUCONATE, SODIUM ACETATE, POTASSIUM CHLORIDE AND MAGNESIUM CHLORIDE 30; 37; 368; 526; 502 MG/100ML; MG/100ML; MG/100ML; MG/100ML; MG/100ML
INJECTION, SOLUTION INTRAVENOUS CONTINUOUS
Status: DISCONTINUED | OUTPATIENT
Start: 2025-05-13 | End: 2025-05-13 | Stop reason: HOSPADM

## 2025-05-13 RX ORDER — KETOROLAC TROMETHAMINE 30 MG/ML
INJECTION, SOLUTION INTRAMUSCULAR; INTRAVENOUS
Status: COMPLETED
Start: 2025-05-13 | End: 2025-05-13

## 2025-05-13 RX ORDER — LIDOCAINE HYDROCHLORIDE 10 MG/ML
1 INJECTION, SOLUTION EPIDURAL; INFILTRATION; INTRACAUDAL; PERINEURAL ONCE
Status: DISCONTINUED | OUTPATIENT
Start: 2025-05-13 | End: 2025-05-13 | Stop reason: HOSPADM

## 2025-05-13 RX ORDER — PHENYLEPHRINE HYDROCHLORIDE 100 MG/ML
SOLUTION/ DROPS OPHTHALMIC
Status: DISCONTINUED | OUTPATIENT
Start: 2025-05-13 | End: 2025-05-13 | Stop reason: HOSPADM

## 2025-05-13 RX ADMIN — KETOROLAC TROMETHAMINE 30 MG: 30 INJECTION, SOLUTION INTRAMUSCULAR; INTRAVENOUS at 04:05

## 2025-05-13 RX ADMIN — LIDOCAINE HYDROCHLORIDE 120 MG: 10 INJECTION, SOLUTION EPIDURAL; INFILTRATION; INTRACAUDAL; PERINEURAL at 03:05

## 2025-05-13 RX ADMIN — ACETAMINOPHEN 1000 MG: 500 TABLET ORAL at 03:05

## 2025-05-13 NOTE — ANESTHESIA POSTPROCEDURE EVALUATION
Anesthesia Post Evaluation    Patient: E C Gabehart    Procedure(s) Performed: Procedure(s) (LRB):  DESTRUCTION, CILIARY BODY, USING LASER    /////left eye; Iridex 4/15 (Left)    Final Anesthesia Type: general      Patient location during evaluation: PACU  Patient participation: Yes- Able to Participate  Level of consciousness: awake and alert and oriented  Post-procedure vital signs: reviewed and stable  Pain management: adequate  Airway patency: patent  LESLIE mitigation strategies: Verification of full reversal of neuromuscular block  PONV status at discharge: No PONV  Anesthetic complications: no      Cardiovascular status: blood pressure returned to baseline and stable  Respiratory status: spontaneous ventilation and unassisted  Hydration status: euvolemic  Follow-up not needed.  Comments: [facility]              Vitals Value Taken Time   /90 05/13/25 16:02   Temp 36.3 05/13/25 16:02   Pulse 72 05/13/25 16:02   Resp 22 05/13/25 16:02   SpO2 99 05/13/25 16:02         No case tracking events are documented in the log.      Pain/Annette Score: Pain Rating Prior to Med Admin: 0 (5/13/2025  3:04 PM)

## 2025-05-13 NOTE — DISCHARGE INSTRUCTIONS
[facility], Care After    Refer to this sheet in the next few weeks. These instructions provide you with information on caring for yourself after your procedure. Your health care provider may also give you more specific instructions. Your treatment has been planned according to current medical practices, but problems sometimes occur. Call your health care provider if you have any problems or questions after your procedure.    WHAT TO EXPECT AFTER THE PROCEDURE  After your procedure, it is typical to have the following:  Changes in depth perception.  Blurry vision.  Eye discomfort.    HOME CARE INSTRUCTIONS  Keep all follow-up visits as directed by your health care provider. This is important.  You may receive medicine to help with pain or discomfort. Take medicines only as directed by your health care provider.  Leave patch on for 6-8 hours, keep it clean and dry. Once removed you will begin your eye drops as directed. You will not require the eye patch once it is removed.  Resume all other meds taken at home.  Continue eye drops as directed.    Meds:  You may take Tylenol or your prescribed Percocet needed for pain.  Next dose at _____ (moderate pain)  You may take Ibuprofen as needed for pain.  Next dose at ______ (mild pain)  You may take Phenergan 12.5mg 1 every 4-5 hours as needed for nausea next dose at any time. May cause drowsiness.     Avoid:  Strenuous activity and rubbing the eye today.    SEEK MEDICAL CARE IF:  You notice signs of infection in your eye.  You develop increased pain, inflammation, or swelling.  You notice visual changes or a pus-like drainage.    SEEK IMMEDIATE MEDICAL CARE IF:  You lose all vision.

## 2025-05-13 NOTE — TRANSFER OF CARE
"Anesthesia Transfer of Care Note    Patient: E C Gabehart    Procedure(s) Performed: Procedure(s) (LRB):  DESTRUCTION, CILIARY BODY, USING LASER    /////left eye; Iridex 4/15 (Left)    Anesthesia Type: MAC    Transport from OR: Transported from OR on room air with adequate spontaneous ventilation    Post pain: adequate analgesia    Post assessment: no apparent anesthetic complications    Post vital signs: stable    Level of consciousness: awake    Nausea/Vomiting: no nausea/vomiting    Complications: none    Transfer of care protocol was followed      Last vitals: Visit Vitals  /83   Pulse 84   Temp 36.9 °C (98.4 °F) (Oral)   Ht 5' 8" (1.727 m)   Wt 71.7 kg (158 lb)   SpO2 (!) 94%   BMI 24.02 kg/m²     "

## 2025-05-13 NOTE — ANESTHESIA PREPROCEDURE EVALUATION
05/12/2025  E C Gabehart is a 69 y.o., male.  Diagnosis: Primary open angle glaucoma of left eye, moderate stage [H40.1122]   Pre-op diagnosis: Primary open angle glaucoma of left eye, moderate stage [H40.1122]   Location: [address] OR 02 / [address] OR   Surgeons: Arely Dacosta MD       The pt. comes to \[facility]\""  for the noted procedure under IV Sedation for Glaucoma management procedure  Case: [id number] Date/Time: 05/13/25 1630   Procedure: DESTRUCTION, CILIARY BODY, USING LASER    /////left eye; Iridex 4/15 (Left)   Anesthesia type: Monitor Anesthesia Care     PMHx:  Heart attack    Other Medical History   Kidney stone Hypertension   LESLIE (obstructive sleep apnea) HLD (hyperlipidemia)   Diabetes mellitus Cancer   Unspecified cataract Unspecified glaucoma   Restless leg syndrome      Problem List  Current as of 05/12/25 2132  Abnormal thyroid function test Arteriosclerosis of coronary artery   Hypogonadism in male Insomnia   Kidney stone Malignant neoplasm of skin   Mixed hyperlipidemia Open-angle glaucoma of left eye   Primary hypertension Restless legs syndrome   Type 2 diabetes mellitus without complication, without long-term current use of insulin          PSHx:  Surgical History:  CORONARY ARTERY BYPASS GRAFT CORONARY STENT PLACEMENT   BACK SURGERY NECK SURGERY   KNEE SURGERY EYE SURGERY   COLONOSCOPY INSERTION, AQUEOUS SHUNT, EYE   INSERTION, AQUEOUS SHUNT, EYE INSERTION, AQUEOUS SHUNT, EYE   REPAIR, SURGICAL WOUND, EYE, ANTERIOR SEGMENT REVISION   INSERTION, AQUEOUS SHUNT, EYE eye lid surgery   PHACOEMULSIFICATION WITH INSERTION, I-STENT DILATION, AQUEOUS OUTFLOW CANAL, TRANSLUMINAL, WITHOUT DEVICE RETENTION   REPAIR, SURGICAL WOUND, EYE, ANTERIOR SEGMENT EXTRACTION, CATARACT, WITH IOL AND AQUEOUS DRAINAGE DEVICE INSERTION   DILATION, AQUEOUS OUTFLOW CANAL, TRANSLUMINAL, WITHOUT DEVICE  RETENTION REPAIR, SURGICAL WOUND, EYE, ANTERIOR SEGMENT   SPINE SURGERY CHOLECYSTECTOMY   VASECTOMY FINGER SURGERY         Vital signs:        Lab Data:      EKG:                          Pre-op Assessment    I have reviewed the Patient Summary Reports.     I have reviewed the Nursing Notes. I have reviewed the NPO Status.   I have reviewed the Medications.     Review of Systems  Anesthesia Hx:  No problems with previous Anesthesia                Social:  Non-Smoker       Hematology/Oncology:  Hematology Normal   Oncology Normal                                   EENT/Dental:  EENT/Dental Normal           Cardiovascular:  Exercise tolerance: good   Hypertension  Past MI CAD  asymptomatic CABG/stent           ECG has been reviewed.    Functional Capacity good / => 4 METS   Coronary Artery Disease:          Hx of Myocardial Infarction                  Hypertension         Pulmonary:        Sleep Apnea     Obstructive Sleep Apnea (LESLIE).           Renal/:  Chronic Renal Disease        Kidney Function/Disease             Hepatic/GI:  Hepatic/GI Normal                    Musculoskeletal:  Musculoskeletal Normal                Neurological:  Neurology Normal                                      Endocrine:  Diabetes    Diabetes                      Dermatological:  Skin Normal    Psych:  Psychiatric Normal                    Physical Exam  General: Alert, Oriented, Well nourished and Cooperative    Airway:  Mallampati: II   Mouth Opening: Normal  TM Distance: Normal  Tongue: Normal  Neck ROM: Normal ROM    Dental:  Intact    Chest/Lungs:  Clear to auscultation, Normal Respiratory Rate    Heart:  Rate: Normal  Rhythm: Regular Rhythm        Anesthesia Plan  Type of Anesthesia, risks & benefits discussed:    Anesthesia Type: MAC, Gen Natural Airway  Intra-op Monitoring Plan: Standard ASA Monitors  Post Op Pain Control Plan: IV/PO Opioids PRN  Induction:  IV  ASA Score: 3  Day of Surgery Review of History & Physical: H&P  Update referred to the surgeon/provider.    Ready For Surgery From Anesthesia Perspective.     .

## 2025-05-13 NOTE — OP NOTE
"ATE OF SURGERY: 5-13-25     SURGEON:  Arely Dacosta MD     PREOPERATIVE DIAGNOSIS: Primary open angle glaucoma left eye -moderate     POSTOPERATIVE DIAGNOSIS:  same status post procedure     PROCEDURE: Iridex laser left  eye   ANESTHESIA:  MAC plus retrobulbar  COMPLICATIONS:  None.   ESTIMATED BLOOD LOSS:  Less than 1 cc.     INDICATIONS:  The patient was evaluated in clinic and noted to have an elevated intraocular pressure on maximum medical therapy.    The advantages, risks and benefits of surgical intervention were discussed with the patient including but not limited to bleeding, infection, decreased vision, loss of the eye and need for subsequent surgery as well as worsening of cataract and diplopia.  The patient acknowledged understanding and wished to proceed.  Informed consent was obtained from the patient in clinic.     PROCEDURE IN DETAIL:  The patient was brought to the operating room and laid supine postion. Anesthesia undertaken without complication. MicroPulse probe was placed superiorly at 9:30 with " bunny ears" of probe set at the corneal-limbus; positioning the laser fiber 3 mm posterior to the limbus over the pars plana portion of the ciliary body. The probe was then moved along a semicircle arc from 9:30 to 2:30 position for application of 10 laser spots The micropulse was then positioned inferiorly and the process was repeated. The 9:00 and 3:00 positions were not lasered. After the procedure was performed the eye lid speculum was removed . The patient was taken to recovery in stable condition.   "

## 2025-05-27 ENCOUNTER — ANESTHESIA EVENT (OUTPATIENT)
Facility: HOSPITAL | Age: 70
End: 2025-05-27
Payer: MEDICARE

## 2025-05-27 ENCOUNTER — ANESTHESIA (OUTPATIENT)
Facility: HOSPITAL | Age: 70
End: 2025-05-27
Payer: MEDICARE

## 2025-05-27 ENCOUNTER — HOSPITAL ENCOUNTER (OUTPATIENT)
Facility: HOSPITAL | Age: 70
Discharge: HOME | End: 2025-05-27
Attending: OPHTHALMOLOGY | Admitting: OPHTHALMOLOGY
Payer: MEDICARE

## 2025-05-27 VITALS
HEART RATE: 63 BPM | WEIGHT: 158.06 LBS | OXYGEN SATURATION: 98 % | BODY MASS INDEX: 23.95 KG/M2 | HEIGHT: 68 IN | TEMPERATURE: 98 F | DIASTOLIC BLOOD PRESSURE: 84 MMHG | SYSTOLIC BLOOD PRESSURE: 129 MMHG

## 2025-05-27 DIAGNOSIS — H40.9 GLAUCOMA: ICD-10-CM

## 2025-05-27 LAB — POCT GLUCOSE: 119 MG/DL (ref 70–110)

## 2025-05-27 PROCEDURE — 63600175 PHARM REV CODE 636 W HCPCS: Performed by: OPHTHALMOLOGY

## 2025-05-27 PROCEDURE — 63600175 PHARM REV CODE 636 W HCPCS: Performed by: NURSE ANESTHETIST, CERTIFIED REGISTERED

## 2025-05-27 PROCEDURE — 71000015 HC POSTOP RECOV 1ST HR: Performed by: OPHTHALMOLOGY

## 2025-05-27 PROCEDURE — 37000008 HC ANESTHESIA 1ST 15 MINUTES: Performed by: OPHTHALMOLOGY

## 2025-05-27 PROCEDURE — 36000706: Performed by: OPHTHALMOLOGY

## 2025-05-27 PROCEDURE — 25000003 PHARM REV CODE 250: Performed by: OPHTHALMOLOGY

## 2025-05-27 PROCEDURE — 25000003 PHARM REV CODE 250

## 2025-05-27 RX ORDER — BUPIVACAINE HYDROCHLORIDE 7.5 MG/ML
INJECTION, SOLUTION EPIDURAL; RETROBULBAR
Status: DISCONTINUED | OUTPATIENT
Start: 2025-05-27 | End: 2025-05-27 | Stop reason: HOSPADM

## 2025-05-27 RX ORDER — GABAPENTIN 300 MG/1
CAPSULE ORAL
Status: DISCONTINUED
Start: 2025-05-27 | End: 2025-05-27 | Stop reason: HOSPADM

## 2025-05-27 RX ORDER — PHENYLEPHRINE HYDROCHLORIDE 100 MG/ML
SOLUTION/ DROPS OPHTHALMIC
Status: DISCONTINUED
Start: 2025-05-27 | End: 2025-05-27 | Stop reason: HOSPADM

## 2025-05-27 RX ORDER — LIDOCAINE HYDROCHLORIDE 10 MG/ML
INJECTION, SOLUTION EPIDURAL; INFILTRATION; INTRACAUDAL; PERINEURAL
Status: DISCONTINUED | OUTPATIENT
Start: 2025-05-27 | End: 2025-05-27

## 2025-05-27 RX ORDER — PROPOFOL 10 MG/ML
VIAL (ML) INTRAVENOUS
Status: DISCONTINUED | OUTPATIENT
Start: 2025-05-27 | End: 2025-05-27

## 2025-05-27 RX ORDER — ONDANSETRON HYDROCHLORIDE 2 MG/ML
4 INJECTION, SOLUTION INTRAVENOUS ONCE AS NEEDED
Status: DISCONTINUED | OUTPATIENT
Start: 2025-05-27 | End: 2025-05-27 | Stop reason: HOSPADM

## 2025-05-27 RX ORDER — GLUCAGON 1 MG
1 KIT INJECTION
Status: DISCONTINUED | OUTPATIENT
Start: 2025-05-27 | End: 2025-05-27 | Stop reason: HOSPADM

## 2025-05-27 RX ORDER — GABAPENTIN 300 MG/1
300 CAPSULE ORAL 3 TIMES DAILY
Status: DISCONTINUED | OUTPATIENT
Start: 2025-05-27 | End: 2025-05-27 | Stop reason: HOSPADM

## 2025-05-27 RX ORDER — PHENYLEPHRINE HYDROCHLORIDE 100 MG/ML
SOLUTION/ DROPS OPHTHALMIC
Status: DISCONTINUED | OUTPATIENT
Start: 2025-05-27 | End: 2025-05-27 | Stop reason: HOSPADM

## 2025-05-27 RX ORDER — ACETAMINOPHEN 500 MG
1000 TABLET ORAL ONCE
Status: COMPLETED | OUTPATIENT
Start: 2025-05-27 | End: 2025-05-27

## 2025-05-27 RX ORDER — LIDOCAINE HYDROCHLORIDE 20 MG/ML
INJECTION, SOLUTION EPIDURAL; INFILTRATION; INTRACAUDAL; PERINEURAL
Status: DISCONTINUED | OUTPATIENT
Start: 2025-05-27 | End: 2025-05-27 | Stop reason: HOSPADM

## 2025-05-27 RX ADMIN — ACETAMINOPHEN 1000 MG: 500 TABLET ORAL at 12:05

## 2025-05-27 RX ADMIN — GABAPENTIN 300 MG: 300 CAPSULE ORAL at 02:05

## 2025-05-27 RX ADMIN — LIDOCAINE HYDROCHLORIDE 50 MG: 10 INJECTION, SOLUTION EPIDURAL; INFILTRATION; INTRACAUDAL; PERINEURAL at 01:05

## 2025-05-27 RX ADMIN — PROPOFOL 100 MG: 10 INJECTION, EMULSION INTRAVENOUS at 01:05

## 2025-05-27 RX ADMIN — PROPOFOL 40 MG: 10 INJECTION, EMULSION INTRAVENOUS at 02:05

## 2025-05-27 RX ADMIN — PROPOFOL 20 MG: 10 INJECTION, EMULSION INTRAVENOUS at 02:05

## 2025-05-27 NOTE — ANESTHESIA POSTPROCEDURE EVALUATION
Anesthesia Post Evaluation    Patient: E C Gabehart    Procedure(s) Performed: Procedure(s) (LRB):  DESTRUCTION, CILIARY BODY, USING LASER     /////right eye; Iridex 4/15 (Right)    Final Anesthesia Type: general      Patient location during evaluation: PACU  Patient participation: Yes- Able to Participate  Level of consciousness: awake and alert and oriented  Post-procedure vital signs: reviewed and stable  Pain management: adequate  Airway patency: patent  LESLIE mitigation strategies: Verification of full reversal of neuromuscular block  PONV status at discharge: No PONV  Anesthetic complications: no      Cardiovascular status: blood pressure returned to baseline and stable  Respiratory status: spontaneous ventilation and unassisted  Hydration status: euvolemic  Follow-up not needed.  Comments: [facility]              Vitals Value Taken Time   /84 05/27/25 14:12   Temp 36 05/27/25 14:19   Pulse 76 05/27/25 14:12   Resp 20 05/27/25 14:19   SpO2 98 % 05/27/25 14:12         No case tracking events are documented in the log.      Pain/Annette Score: Pain Rating Prior to Med Admin: 0 (5/27/2025 12:17 PM)  Annette Score: 9 (5/27/2025  2:11 PM)

## 2025-05-27 NOTE — TRANSFER OF CARE
"Anesthesia Transfer of Care Note    Patient: E C Gabehart    Procedure(s) Performed: Procedure(s) (LRB):  DESTRUCTION, CILIARY BODY, USING LASER     /////right eye; Iridex 4/15 (Right)    Patient location: OPS    Anesthesia Type: general    Transport from OR: Transported from OR on room air with adequate spontaneous ventilation    Post pain: adequate analgesia    Post assessment: no apparent anesthetic complications    Post vital signs: stable    Level of consciousness: sedated    Nausea/Vomiting: no nausea/vomiting    Complications: none    Transfer of care protocol was followed      Last vitals: Visit Vitals  /84   Pulse 76   Temp 36.4 °C (97.6 °F) (Oral)   Ht 5' 8" (1.727 m)   Wt 71.7 kg (158 lb 1.1 oz)   SpO2 98%   BMI 24.03 kg/m²     "

## 2025-05-27 NOTE — ANESTHESIA PREPROCEDURE EVALUATION
05/27/2025  E C Gabehart is a 69 y.o., male, who presents for the following:    Procedure: DESTRUCTION, CILIARY BODY, USING LASER     /////right eye; Iridex 4/15 (Right)   Anesthesia type: Monitor Anesthesia Care   Diagnosis: Primary open angle glaucoma of right eye, moderate stage [H40.1112]   Pre-op diagnosis: Primary open angle glaucoma of right eye, moderate stage [H40.1112]   Location: [address] [place] [address] OR   Surgeons: Arely Dacosta MD       [facility] with previous Cataract/Glaucoma Surgery under MAC anesthesia      PMHx:  Heart attack     Other Medical History   Kidney stone Hypertension   LESLIE (obstructive sleep apnea) HLD (hyperlipidemia)   Diabetes mellitus Cancer   Unspecified cataract Unspecified glaucoma   Restless leg syndrome       Problem List  Current as of 05/12/25 2132  Abnormal thyroid function test Arteriosclerosis of coronary artery   Hypogonadism in male Insomnia   Kidney stone Malignant neoplasm of skin   Mixed hyperlipidemia Open-angle glaucoma of left eye   Primary hypertension Restless legs syndrome   Type 2 diabetes mellitus without complication, without long-term current use of insulin        Pre-op Assessment    I have reviewed the Patient Summary Reports.     I have reviewed the Nursing Notes. I have reviewed the NPO Status.   I have reviewed the Medications.     Review of Systems  Anesthesia Hx:  No problems with previous Anesthesia             Denies Family Hx of Anesthesia complications.    Denies Personal Hx of Anesthesia complications.                    Social:  Non-Smoker       Cardiovascular:     Hypertension  Past MI CAD           hyperlipidemia                               Pulmonary:        Sleep Apnea                Renal/:   renal calculi               Musculoskeletal:     RLS            Endocrine:  Diabetes, type 2               Physical  Exam  General: Alert and Oriented    Airway:  Mallampati: II   Mouth Opening: Normal  TM Distance: Normal  Tongue: Normal  Neck ROM: Normal ROM    Dental:  Intact    Chest/Lungs:  Normal Respiratory Rate    Heart:  Rate: Normal  Rhythm: Regular Rhythm        Anesthesia Plan  Type of Anesthesia, risks & benefits discussed:    Anesthesia Type: MAC  Intra-op Monitoring Plan: Standard ASA Monitors  Post Op Pain Control Plan: IV/PO Opioids PRN  Induction:  IV  Airway Plan: Direct  Informed Consent: Informed consent signed with the Patient and all parties understand the risks and agree with anesthesia plan.  All questions answered. Patient consented to blood products? No  ASA Score: 3  Day of Surgery Review of History & Physical: H&P Update referred to the surgeon/provider.  Anesthesia Plan Notes: Nasal Cannula vs O2 Via Facemask  Topical Local Anesthesia per Surgeon    Ready For Surgery From Anesthesia Perspective.     .

## 2025-05-27 NOTE — OP NOTE
"DATE OF SURGERY: 5-27-25     SURGEON:  Arely Dacosta MD     PREOPERATIVE DIAGNOSIS: Primary open angle glaucoma right eye -moderate     POSTOPERATIVE DIAGNOSIS:  same status post procedure     PROCEDURE: Iridex laser right eye   ANESTHESIA:  MAC plus retrobulbar  COMPLICATIONS:  None.   ESTIMATED BLOOD LOSS:  Less than 1 cc.     INDICATIONS:  The patient was evaluated in clinic and noted to have an elevated intraocular pressure on maximum medical therapy.    The advantages, risks and benefits of surgical intervention were discussed with the patient including but not limited to bleeding, infection, decreased vision, loss of the eye and need for subsequent surgery as well as worsening of cataract and diplopia.  The patient acknowledged understanding and wished to proceed.  Informed consent was obtained from the patient in clinic.     PROCEDURE IN DETAIL:  The patient was brought to the operating room and laid supine postion. Anesthesia undertaken without complication. MicroPulse probe was placed superiorly at 9:30 with " bunny ears" of probe set at the corneal-limbus; positioning the laser fiber 3 mm posterior to the limbus over the pars plana portion of the ciliary body. The probe was then moved along a semicircle arc from 9:30 to 2:30 position for application of 10 laser spots The micropulse was then positioned inferiorly and the process was repeated. The 9:00 and 3:00 positions were not lasered. After the procedure was performed the eye lid speculum was removed . The patient was taken to recovery in stable condition.   "

## 2025-05-27 NOTE — DISCHARGE INSTRUCTIONS
[facility], Care After    Refer to this sheet in the next few weeks. These instructions provide you with information on caring for yourself after your procedure. Your health care provider may also give you more specific instructions. Your treatment has been planned according to current medical practices, but problems sometimes occur. Call your health care provider if you have any problems or questions after your procedure.    WHAT TO EXPECT AFTER THE PROCEDURE  After your procedure, it is typical to have the following:  Changes in depth perception.  Blurry vision.  Eye discomfort.    HOME CARE INSTRUCTIONS  Keep all follow-up visits as directed by your health care provider. This is important.  You may receive medicine to help with pain or discomfort. Take medicines only as directed by your health care provider.  Leave patch on for 6-8 hours, keep it clean and dry. Once removed you will begin your eye drops as directed. You will not require the eye patch once it is removed.  Resume all other meds taken at home.  Continue eye drops as directed.    Meds:    Percocet: take as needed for pain, for pain, first dose 4:15 pm  You may take Tylenol or your prescribed Percocet needed for pain.  Next dose at ___4:15 pm__ (moderate pain)  You may take Ibuprofen as needed for pain.  Next dose at ___anytime___ (mild pain)  You may take Phenergan 12.5mg 1 every 4-5 hours as needed for nausea next dose at any time. May cause drowsiness.     Avoid:  Strenuous activity and rubbing the eye today.    SEEK MEDICAL CARE IF:  You notice signs of infection in your eye.  You develop increased pain, inflammation, or swelling.  You notice visual changes or a pus-like drainage.    SEEK IMMEDIATE MEDICAL CARE IF:  You lose all vision.

## (undated) DEVICE — SHIELD EYE PLASTIC CLEAR ADLT

## (undated) DEVICE — GLOVE SIGNATURE MICRO LTX 6

## (undated) DEVICE — DRESSING TRANS 2X2 TEGADERM

## (undated) DEVICE — APPLICATOR COTTIN TIP STRL 3IN

## (undated) DEVICE — GLOVE SENSICARE PI MICRO 6

## (undated) DEVICE — NDL RETROBULBAR .50 X 38MM

## (undated) DEVICE — SPONGE WEC CEL SPEARS

## (undated) DEVICE — SUT 7/0 18IN COATED VICRYL

## (undated) DEVICE — PAD EYE L STRL  2.125X2.625IN

## (undated) DEVICE — SYR 3ML LL 18GA 1.5IN

## (undated) DEVICE — SWABSTICK PVP IODINE 3 S

## (undated) DEVICE — SYR W NDL BLN FILL 5ML 18GX1.5

## (undated) DEVICE — PACK CASSETTE TUBE ELLIPS FX

## (undated) DEVICE — TIP PHACO 30 DEG BEVEL 21GA

## (undated) DEVICE — SYR TB DETACH NDL ST 25G 5/8IN

## (undated) DEVICE — NDL SYR 10ML 18X1.5 LL BLUNT

## (undated) DEVICE — TIP PHACO 30 DEG BEVEL 20GA

## (undated) DEVICE — ADAPTER DUAL NSL LUER M-M 7FT

## (undated) DEVICE — PACK EYE FOREMAN DISPOSABLE

## (undated) DEVICE — TOWEL OR DISP STRL BLUE 4/PK

## (undated) DEVICE — SPONGE COTTON TRAY 4X4IN

## (undated) DEVICE — SYS OMNI GLAUCOMA TREATMENT

## (undated) DEVICE — TIP I & A

## (undated) DEVICE — Device